# Patient Record
(demographics unavailable — no encounter records)

---

## 2024-10-21 NOTE — ASSESSMENT
[FreeTextEntry1] : 1. ?FA - check immunocap to peanut and tree nut ? SPT based on results  2. ?AR - SPT to 10 ENV

## 2024-10-21 NOTE — SOCIAL HISTORY
[Apartment] : [unfilled] lives in an apartment  [Radiator/Baseboard] : heating provided by radiator(s)/baseboard(s) [Window Units] : air conditioning provided by window units [Dust Mite Covers] : has dust mite covers [Dog] : dog [Smokers in Household] : there are smokers in the home [Humidifier] : does not use a humidifier [Dehumidifier] : does not use a dehumidifier [Cockroaches] : Patient states that there are no cockroaches in the home [Feather Pillows] : does not have feather pillows [Feather Comforter] : does not have a feather comforter [Bedroom] : not in the bedroom [Basement] : not in the basement [Living Area] : not in the living area [de-identified] : N/A [de-identified] : N/A

## 2024-10-21 NOTE — HISTORY OF PRESENT ILLNESS
[Asthma] : asthma [Venom Reactions] : venom reactions [de-identified] : KYLE GILLETTE  is a 6 year old female who has a history of food allergies, when she was two years old, she was introduced to peanuts for the first time, she instantly broke out in hives and began to throw up, she was rushed to the ER and her tongue began to swell up, they administered EpiPen and was monitored for a few hours. Mom took her to an allergist; she had skin prick allergy testing done but no allergens showed, she then had blood work done that said she was highly allergic to peanuts, ever since she has avoided giving her peanuts. Recently she was spending time with her dad she ate a peanut M&M, she says she had no reaction, but dad says she did not eat it, mom wants to have her rested to know if she is still allergic to peanuts. She has seasonal allergies; in the spring season she begins to sneeze, have itchy, watery eyes and nasal congestion, mom gives her Cetirizine as needed, she says it helps instantly.

## 2024-10-21 NOTE — PHYSICAL EXAM
[Alert] : alert [Well Nourished] : well nourished [Healthy Appearance] : healthy appearance [No Acute Distress] : no acute distress [Well Developed] : well developed [Normal Pupil & Iris Size/Symmetry] : normal pupil and iris size and symmetry [No Discharge] : no discharge [No Photophobia] : no photophobia [Sclera Not Icteric] : sclera not icteric [Normal TMs] : both tympanic membranes were normal [Normal Nasal Mucosa] : the nasal mucosa was normal [Normal Lips/Tongue] : the lips and tongue were normal [Normal Outer Ear/Nose] : the ears and nose were normal in appearance [Normal Tonsils] : normal tonsils [No Thrush] : no thrush [Pale mucosa] : pale mucosa [Supple] : the neck was supple [Normal Rate and Effort] : normal respiratory rhythm and effort [No Crackles] : no crackles [No Retractions] : no retractions [Bilateral Audible Breath Sounds] : bilateral audible breath sounds [Normal Rate] : heart rate was normal  [Normal S1, S2] : normal S1 and S2 [No murmur] : no murmur [Regular Rhythm] : with a regular rhythm [Soft] : abdomen soft [Not Tender] : non-tender [Not Distended] : not distended [No HSM] : no hepato-splenomegaly [Skin Intact] : skin intact  [No Rash] : no rash [No Skin Lesions] : no skin lesions [Normal Mood] : mood was normal [Normal Affect] : affect was normal [Alert, Awake, Oriented as Age-Appropriate] : alert, awake, oriented as age appropriate

## 2024-11-05 NOTE — HISTORY OF PRESENT ILLNESS
[FreeTextEntry6] : 2 weeks cough and cold symptoms  low grade 100F today  OTC cough medicine  going for allergy blood test today